# Patient Record
Sex: MALE | Race: WHITE
[De-identification: names, ages, dates, MRNs, and addresses within clinical notes are randomized per-mention and may not be internally consistent; named-entity substitution may affect disease eponyms.]

---

## 2018-07-23 ENCOUNTER — HOSPITAL ENCOUNTER (EMERGENCY)
Dept: HOSPITAL 36 - ER | Age: 43
Discharge: HOME | End: 2018-07-23
Payer: MEDICAID

## 2018-07-23 DIAGNOSIS — Z90.49: ICD-10-CM

## 2018-07-23 DIAGNOSIS — M25.561: Primary | ICD-10-CM

## 2018-07-23 DIAGNOSIS — M25.461: ICD-10-CM

## 2018-07-23 LAB
ALBUMIN SERPL-MCNC: 4.5 GM/DL (ref 4.2–5.5)
ALBUMIN/GLOB SERPL: 1.6 {RATIO} (ref 1–1.8)
ALP SERPL-CCNC: 70 U/L (ref 34–104)
ALT SERPL-CCNC: 23 U/L (ref 7–52)
ANION GAP SERPL CALC-SCNC: 11.8 MMOL/L (ref 7–16)
APPEARANCE UR: CLEAR
AST SERPL-CCNC: 20 U/L (ref 13–39)
BACTERIA #/AREA URNS HPF: (no result) /HPF
BASOPHILS # BLD AUTO: 0 TH/CUMM (ref 0–0.2)
BASOPHILS NFR BLD AUTO: 0.1 % (ref 0–2)
BILIRUB SERPL-MCNC: 1 MG/DL (ref 0.3–1)
BILIRUB UR-MCNC: NEGATIVE MG/DL
BUN SERPL-MCNC: 11 MG/DL (ref 7–25)
CALCIUM SERPL-MCNC: 9.4 MG/DL (ref 8.6–10.3)
CHLORIDE SERPL-SCNC: 102 MEQ/L (ref 98–107)
CO2 SERPL-SCNC: 26.6 MEQ/L (ref 21–31)
COLOR UR: YELLOW
CREAT SERPL-MCNC: 0.8 MG/DL (ref 0.7–1.3)
EOSINOPHIL # BLD AUTO: 0.1 TH/CMM (ref 0.1–0.4)
EOSINOPHIL NFR BLD AUTO: 0.5 % (ref 0–5)
EPI CELLS URNS QL MICRO: (no result) /LPF
ERYTHROCYTE [DISTWIDTH] IN BLOOD BY AUTOMATED COUNT: 12.6 % (ref 11.5–20)
GLOBULIN SER-MCNC: 2.8 GM/DL
GLUCOSE SERPL-MCNC: 93 MG/DL (ref 70–105)
GLUCOSE UR STRIP-MCNC: NEGATIVE MG/DL
HCT VFR BLD CALC: 48.8 % (ref 41–60)
HGB BLD-MCNC: 16.7 GM/DL (ref 12–16)
INR PPP: 1.04 (ref 0.5–1.4)
KETONES UR STRIP-MCNC: NEGATIVE MG/DL
LEUKOCYTE ESTERASE UR-ACNC: NEGATIVE
LYMPHOCYTE AB SER FC-ACNC: 2.2 TH/CMM (ref 1.5–3)
LYMPHOCYTES NFR BLD AUTO: 19.9 % (ref 20–50)
MCH RBC QN AUTO: 32.1 PG (ref 26–30)
MCHC RBC AUTO-ENTMCNC: 34.3 PG (ref 28–36)
MCV RBC AUTO: 93.6 FL (ref 80–99)
MICRO URNS: YES
MONOCYTES # BLD AUTO: 0.6 TH/CMM (ref 0.3–1)
MONOCYTES NFR BLD AUTO: 5.5 % (ref 2–10)
NEUTROPHILS # BLD: 8 TH/CMM (ref 1.8–8)
NEUTROPHILS NFR BLD AUTO: 74 % (ref 40–80)
NITRITE UR QL STRIP: NEGATIVE
PH UR STRIP: 6 [PH] (ref 4.6–8)
PLATELET # BLD: 272 TH/CMM (ref 150–400)
PMV BLD AUTO: 7.9 FL
POTASSIUM SERPL-SCNC: 3.4 MEQ/L (ref 3.5–5.1)
PROT UR STRIP-MCNC: NEGATIVE MG/DL
PROTHROMBIN TIME: 10.8 SECONDS (ref 9.5–11.5)
RBC # BLD AUTO: 5.21 MIL/CMM (ref 4.3–5.7)
RBC # UR STRIP: NEGATIVE /UL
RBC #/AREA URNS HPF: (no result) /HPF (ref 0–5)
SODIUM SERPL-SCNC: 137 MEQ/L (ref 136–145)
SP GR UR STRIP: 1.02 (ref 1–1.03)
URINALYSIS COMPLETE PNL UR: (no result)
UROBILINOGEN UR STRIP-ACNC: 0.2 E.U./DL (ref 0.2–1)
WBC # BLD AUTO: 10.9 TH/CMM (ref 4.8–10.8)
WBC #/AREA URNS HPF: (no result) /HPF (ref 0–5)

## 2018-07-23 PROCEDURE — 85610 PROTHROMBIN TIME: CPT

## 2018-07-23 PROCEDURE — 85730 THROMBOPLASTIN TIME PARTIAL: CPT

## 2018-07-23 PROCEDURE — 87040 BLOOD CULTURE FOR BACTERIA: CPT

## 2018-07-23 PROCEDURE — 84550 ASSAY OF BLOOD/URIC ACID: CPT

## 2018-07-23 PROCEDURE — 73702 CT LWR EXTREMITY W/O&W/DYE: CPT

## 2018-07-23 PROCEDURE — 85025 COMPLETE CBC W/AUTO DIFF WBC: CPT

## 2018-07-23 PROCEDURE — 81001 URINALYSIS AUTO W/SCOPE: CPT

## 2018-07-23 PROCEDURE — 80053 COMPREHEN METABOLIC PANEL: CPT

## 2018-07-23 PROCEDURE — 99285 EMERGENCY DEPT VISIT HI MDM: CPT

## 2018-07-23 PROCEDURE — 36415 COLL VENOUS BLD VENIPUNCTURE: CPT

## 2018-07-23 PROCEDURE — 96372 THER/PROPH/DIAG INJ SC/IM: CPT

## 2018-07-23 NOTE — ED PHYSICIAN CHART
ED Chief Complaint/HPI





- Patient Information


Date Seen:: 07/23/18


Time Seen:: 13:50


Chief Complaint:: RIGHT KNEE PAIN


History of Present Illness:: 





THIS IS A 42 YEAR OLD MALE WHO IS CONCERNED ABOUT THE PAIN AND SWELLING OF THE 

RIGHT KNEE WHICH GOT SO BAD THIS AM HE COULD NOT WALK TODAY.   HE DENIES HAVING 

ANY RECENT OR OLD RIGHT KNEE TRAUMA. HE DENIES DIABETES, HTN OR HEART DISEASE.


Allergies:: 


 Allergies











Allergy/AdvReac Type Severity Reaction Status Date / Time


 


No Known Allergies Allergy   Verified 07/23/18 13:52











Vitals:: 


 Vital Signs - 8 hr











  07/23/18





  13:52


 


Temp 98.7 F


 


HR 83


 


RR 16


 


/76


 


O2 Sat % 97











Historian:: Patient


Review:: Nurse's Note Reviewed





ED Review of Systems





- Review of Systems


Musculoskeletal: Bone or joint pain (RIGHT KNEE PAIN)





ED Past Medical History





- Past Medical History


Obtainable: Yes


Past Medical History: No significant medical hx


Family History: None


Social History: Non Smoker, No Alcohol, No Drug Use, , Employed


Surgical History: Appendectomy


Psychiatricy History: None


Medication: Reviewed





Family Medical History





- Family Member


  ** Father


History Unknown: Yes





ED Physical Exam





- Physical Examination


General/Constitutional: Awake, Well-developed, well-nourished, Alert, No 

distress, GCS 15, Non-toxic appearing, Ambulatory


Head: Atraumatic


Eyes: Lids, conjuctiva normal, PERRL, EOMI


Skin: Nl inspection, No rash, No skin lesions, No ecchymosis, Well hydrated, No 

lymphadenopathy


ENMT: External ears, nose nl, Nasal exam nl, Lips, teeth, gums nl


Neck: Nontender, Full ROM w/o pain, No JVD, No nuchal rigidity, No bruit, No 

mass, No stridor


Respiratory: Nl effort/Exclusion, Clear to Auscultation, No Wheeze/Rhonchi/Rales


Cardio Vascular: RRR, No murmur, gallop, rubs, NL S1 S2


GI: No tenderness/rebounding/guarding, No organomegaly, No hernia, Normal BS's, 

Nondistended, No mass/bruits, No McBurney tenderness


: No CVA tenderness


Extremities: No tenderness or effusion (RIGHT KNEE IS SWOLLEN WITH AN EFFUSION, 

WARM, TENDER AND RED.), Full ROM, normal strength in all extremities, No edema, 

Normal digits & nails


Neuro/Psych: Alert/oriented, DTR's symmetric, Normal sensory exam, Normal motor 

strength, Judgement/insight normal, Mood normal, Normal gait, No focal deficits


Misc: Normal back, No paraspinal tenderness





ED Septic Shock





- <6hrs of presentation:


Vital Signs: 


 Vital Signs - 8 hr











  07/23/18





  13:52


 


Temp 98.7 F


 


HR 83


 


RR 16


 


/76


 


O2 Sat % 97

## 2018-07-24 ENCOUNTER — HOSPITAL ENCOUNTER (INPATIENT)
Dept: HOSPITAL 36 - ER | Age: 43
LOS: 2 days | Discharge: HOME | DRG: 720 | End: 2018-07-26
Attending: FAMILY MEDICINE | Admitting: FAMILY MEDICINE
Payer: MEDICAID

## 2018-07-24 DIAGNOSIS — M00.9: ICD-10-CM

## 2018-07-24 DIAGNOSIS — L02.415: ICD-10-CM

## 2018-07-24 DIAGNOSIS — M70.40: ICD-10-CM

## 2018-07-24 DIAGNOSIS — L03.115: ICD-10-CM
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
## 2018-07-24 DIAGNOSIS — F17.200: ICD-10-CM

## 2018-07-24 DIAGNOSIS — A41.9: Primary | ICD-10-CM

## 2018-07-24 LAB
ALBUMIN SERPL-MCNC: 3.8 GM/DL (ref 4.2–5.5)
ALBUMIN/GLOB SERPL: 1.5 {RATIO} (ref 1–1.8)
ALP SERPL-CCNC: 72 U/L (ref 34–104)
ALT SERPL-CCNC: 21 U/L (ref 7–52)
ANION GAP SERPL CALC-SCNC: 9 MMOL/L (ref 7–16)
AST SERPL-CCNC: 24 U/L (ref 13–39)
BASOPHILS # BLD AUTO: 0 TH/CUMM (ref 0–0.2)
BASOPHILS NFR BLD AUTO: 0.2 % (ref 0–2)
BILIRUB SERPL-MCNC: 1 MG/DL (ref 0.3–1)
BUN SERPL-MCNC: 10 MG/DL (ref 7–25)
CALCIUM SERPL-MCNC: 8.9 MG/DL (ref 8.6–10.3)
CHLORIDE SERPL-SCNC: 107 MEQ/L (ref 98–107)
CO2 SERPL-SCNC: 24.9 MEQ/L (ref 21–31)
CREAT SERPL-MCNC: 0.7 MG/DL (ref 0.7–1.3)
EOSINOPHIL # BLD AUTO: 0.1 TH/CMM (ref 0.1–0.4)
EOSINOPHIL NFR BLD AUTO: 0.7 % (ref 0–5)
ERYTHROCYTE [DISTWIDTH] IN BLOOD BY AUTOMATED COUNT: 12.3 % (ref 11.5–20)
ERYTHROCYTE [SEDIMENTATION RATE] IN BLOOD: 8 MM/HR (ref 0–20)
GLOBULIN SER-MCNC: 2.5 GM/DL
GLUCOSE SERPL-MCNC: 114 MG/DL (ref 70–105)
HCT VFR BLD CALC: 45.1 % (ref 41–60)
HGB BLD-MCNC: 15.5 GM/DL (ref 12–16)
LYMPHOCYTE AB SER FC-ACNC: 1.8 TH/CMM (ref 1.5–3)
LYMPHOCYTES NFR BLD AUTO: 19.2 % (ref 20–50)
MCH RBC QN AUTO: 32 PG (ref 26–30)
MCHC RBC AUTO-ENTMCNC: 34.3 PG (ref 28–36)
MCV RBC AUTO: 93.5 FL (ref 80–99)
MONOCYTES # BLD AUTO: 0.8 TH/CMM (ref 0.3–1)
MONOCYTES NFR BLD AUTO: 8.2 % (ref 2–10)
NEUTROPHILS # BLD: 6.9 TH/CMM (ref 1.8–8)
NEUTROPHILS NFR BLD AUTO: 71.7 % (ref 40–80)
PLATELET # BLD: 250 TH/CMM (ref 150–400)
PMV BLD AUTO: 8.1 FL
POTASSIUM SERPL-SCNC: 3.9 MEQ/L (ref 3.5–5.1)
RBC # BLD AUTO: 4.83 MIL/CMM (ref 4.3–5.7)
SODIUM SERPL-SCNC: 137 MEQ/L (ref 136–145)
WBC # BLD AUTO: 9.6 TH/CMM (ref 4.8–10.8)

## 2018-07-24 RX ADMIN — DEXTROSE AND SODIUM CHLORIDE SCH MLS/HR: 5; .45 INJECTION, SOLUTION INTRAVENOUS at 17:51

## 2018-07-24 NOTE — DIAGNOSTIC IMAGING REPORT
CT right knee with IV contrast



History: Pain, swelling, effusion



Comparison: None



Technique: Axial images were obtained from the distal femur or proximal

tibia with IV contrast.  Reconstructions were made.



Total , CTD I 4



Findings:



Exam is limited due to motion including limiting assessment for

fractures.  Minimal degenerative changes are noted.  The knee joint

spaces are preserved.  No evidence of a joint effusion.  There is

diffuse subcutaneous edema and seen throughout the prepatellar and

infrapatellar soft tissues with small amount of fluid and possible

phlegmonous changes.  There may be tiny filling defects within the right

popliteal vein.  No evidence of osseous erosions.



IMPRESSION:



Limited exam due to motion.  There is diffuse subcutaneous edema and

inflammatory changes along the prepatellar and infrapatellar soft

tissues.  Small amount of fluid is seen located along the prepatellar

region.  Phlegmonous changes and small early abscess formation this

region cannot be excluded.  Clinical correlation and follow-up is

recommended.



Possible small Filling defect within the right popliteal vein.  This may

be due to the phase of contrast opacification, however, DVT in these

regions cannot be excluded.  Recommend dedicated DVT study for further

assessment



No evidence of a knee effusion.



The final results and recommendations were administered and discussed

with the referring team on 7/24/2018 8:44 AM.

## 2018-07-24 NOTE — DIAGNOSTIC IMAGING REPORT
Right knee 3 views



Indication: pain



Comparison: Right knee CT on 7/23/2018 



Findings: No evidence of an acute fracture or dislocation.  No osseous

erosions.  No joint effusion.  There is subcutaneous edema with soft

tissue swelling greatest along the prepatellar and infrapatellar

regions.



Impression:



No evidence of an acute fracture.



Subcutaneous edema and soft tissue swelling greatest along the

prepatellar and infrapatellar regions.  Please refer to recent CT

examination for further details.



In the setting of trauma, if clinical symptoms persist and there is

continued concern for an occult fracture, follow up exams in 5-7 days is

suggested.

## 2018-07-24 NOTE — ED PHYSICIAN CHART
ED Chief Complaint/HPI





- Patient Information


Date Seen:: 07/24/18


Time Seen:: 05:10


Chief Complaint:: right knee pain


History of Present Illness:: 





Patient had onset 2 days ago of right knee pain.  Patient's had chills but no 

fever.  No trauma.  Patient was seen in this emergency department yesterday and 

prescribed Cipro and a Z-Amador.  He is taken two of the Z-Amador and 1 Cipro.  Right 

knee pain is increased.


Allergies:: 


 Allergies











Allergy/AdvReac Type Severity Reaction Status Date / Time


 


No Known Allergies Allergy   Verified 07/24/18 04:55











Vitals:: 


 Vital Signs - 8 hr











  07/24/18





  04:50


 


Temp 98.4 F


 


HR 83


 


RR 18


 


/83


 


O2 Sat % 97











Historian:: Patient, Family Member


Review:: Nurse's Note Reviewed





ED Review of Systems





- Review of Systems


General/Constitutional: Chills


Skin: Skin lesions


Head: No headache


Eyes: No loss of vision


ENT: No earache


Neck: No neck pain


Cardio Vascular: No chest pain


Pulmonary: No SOB


GI: No nausea, No vomiting, No diarrhea


G/U: No dysuria


Musculoskeletal: Bone or joint pain


Endocrine: No polyuria


Psychiatric: No prior psych history, No anxiety


Hematopoietic: No bruising


Allergic/Immuno: No urticaria


Neurological: Focal symptoms, Weakness





ED Past Medical History





- Past Medical History


Past Medical History: No significant medical hx


Family History: None


Social History: Smoker, Alcohol, Other (slight tobacco and slight alcohol use)


Surgical History: Appendectomy


Psychiatricy History: None


Medication: Reviewed





Family Medical History





- Family Member


  ** Father


History Unknown: Yes


Ethnicity: 





ED Physical Exam





- Physical Examination


General/Constitutional: Awake, Well-developed, well-nourished, Alert, No 

distress, GCS 15, Non-toxic appearing, Ambulatory


Head: Atraumatic


Eyes: Lids, conjuctiva normal, PERRL, EOMI


Skin: Well hydrated, No lymphadenopathy


Other Skin comments:: 





Right leg:  there is about 25 cm of pale erythema extending distally from the 

skin over the most proximal aspect of the patella.  The redness is over the 

anterior and lateral aspect of the knee and right lower leg.   Over the 

patellar tendon there is  about a 2-1/2 cm slightly raised area which is the 

site of maximum tenderness


ENMT: External ears, nose nl, Nasal exam nl, Lips, teeth, gums nl


Neck: Nontender, Full ROM w/o pain, No JVD, No nuchal rigidity, No bruit, No 

mass, No stridor


Respiratory: Nl effort/Exclusion, Clear to Auscultation, No Wheeze/Rhonchi/Rales


Cardio Vascular: RRR, No murmur, gallop, rubs, NL S1 S2


GI: No tenderness/rebounding/guarding, No organomegaly, No hernia, Normal BS's, 

Nondistended, No mass/bruits, No McBurney tenderness


: No CVA tenderness


Extremities: No edema


Other Extremities comments:: 





About 90 of passive flexion right knee


Neuro/Psych: Alert/oriented, DTR's symmetric, Normal sensory exam, Normal motor 

strength, Judgement/insight normal, Mood normal, Normal gait, No focal deficits


Misc: Normal back, No paraspinal tenderness





ED Assessment





- Assessment


General Assessment: 





The area of maximum swelling and tenderness over the patellar tendon may be an 

early abscess.  If adeline MRSA is a major consideration of the etiology of the 

infection.  Since the infection seems to be getting worse is appropriate to 

admit the patient.  I spoke to Dr. Aidan Wynne at about 0600 and he will be 

the admitting physician/





ED Septic Shock





- .


Is Septic Shock (SBP<90, OR Lactate>4 mmol\L) present?: No





- <6hrs of presentation:


Vital Signs: 


 Vital Signs - 8 hr











  07/24/18





  04:50


 


Temp 98.4 F


 


HR 83


 


RR 18


 


/83


 


O2 Sat % 97














ED Reassessment (Disposition)





- Reassessment


Reassessment Condition:: Unchanged





- Diagnosis


Diagnosis:: 


Cellulitis right knee and right lower leg; early abscess right knee





- Patient Disposition


Admitted to:: Med/Surg


Spoke to:: Aidan Wynne


Admitting Medical Physician:: Aidan Wynne


Condition at Disposition:: Stable, Unchanged

## 2018-07-24 NOTE — HISTORY AND PHYSICAL
History of Present Illness





- HPI


Chief Complaint: 





right knee pain


HPI: 





43 y/o male who presents to [facility] ER for right knee pain 

and swelling. Patient was seen in the ER prior to admission and was initially 

treat with PO antibiotics. Patient return to the ER with worsening of his 

symptoms. Initial labwork showed a slight increased in WBC's to 10.9. Patient K

+ was noted to be low at 3.4. Patient is a  and his swelling 

has been increasing for the past 2 days. Patient's had chills but no fever.  No 

trauma.  Patient was seen in this emergency department yesterday and prescribed 

Cipro and a Z-Amador.  He is taken two of the Z-Amador and 1 Cipro.  Right knee pain 

is increased.


Vital Signs: 





 Last Vital Signs











Temp  98.0 F   07/24/18 06:55


 


Pulse  78   07/24/18 06:55


 


Resp  18   07/24/18 06:55


 


BP  125/80   07/24/18 06:55


 


Pulse Ox  98   07/24/18 06:55














Past Medical History


Cardiovascular: Report: No Pertinent Hx


Pulmonary: Report: No Pertinent Hx


CNS: Report: No Pertinent Hx


GI: Report: No Pertinent Hx


Psych: Report: No Pertinent Hx


Musculoskeletal: Report: Swelling (right knee swelling and pain)


Rheumatologic: Report: No pertinent Hx


Infectious Disease: Report: No Pertinent Hx


Renal/: Report: No Pertinent Hx


Endocrine: Report: No Pertinent Hx


Dermatology: Report: No Pertinent Hx





- Past Surgical History


Past Surgical History: No pertinent Hx





Family Medical History





- Family Member


  ** Father


History Unknown: Yes


Ethnicity: 





Social History


Smoke: No


Alcohol: None


Drugs: None


Lives: With Family





- Medications


Home Medications: 


Home Medication











 Medication  Instructions  Recorded  Type


 


Azithromycin [Zithromax Tri-Amador] 500 mg PO DAILY #1 tab 07/23/18 Rx


 


Ciprofloxacin HCl [Cipro] 250 mg PO BID 7 Days #14 tab 07/23/18 Rx














- Allergies


Allergies/Adverse Reactions: 


 Allergies











Allergy/AdvReac Type Severity Reaction Status Date / Time


 


No Known Allergies Allergy   Verified 07/24/18 04:55














Review of Systems





- Review of Systems


Constitutional: Report: No Significant


Eyes: Report: No Significant


ENT: Report: No Significant


Respiratory: Report: No Significant


Cardiovascular: Report: No Significant


Gastrointestinal: Report: No Significant


Genitourinary: Report: No Significant


Musculoskeletal: Report: Other (right knee pain)


Skin: Report: No Significant


Neurological: Report: No Significant





Physical Exam





- Physical Exam


HEENT: Report: Ears Nose Throat within normal limits, Pharnyx within normal 

limits


Neck: Report: Within normal limits


Cardiovascular Systems: Report: +s1/s2 noted, Regular, Rate and Rhythm


Respiratory: Report: Breath Sounds are within normal limits, Clear to 

Auscultation of lung fields


Abdomen: Report: Non-tender to palpation


Back: Report: Inspection of back is within normal limits.


Extremities: Report: Other (right knee erythema, swelling noted to the 

prepateller region)


Skin: Report: Warm (warmth and redness noted at the right prepatellar area)


Neuro/Psych: Report: Mood affect is within normal limits, A+Ox3, CN II-XII 

intact





- Assessment


Assessment: 





right knee cellulitis


possible early right knee abscess


sepsis





- Plan


Plan: 





will order ortho consult


xray of the right knee


vancomycin per pharmacy


zosyn IV


repeat CBC, CMP, ESR, blood cultures


IV pain meds

## 2018-07-25 NOTE — DIAGNOSTIC IMAGING REPORT
Right lower extremity Doppler venous ultrasound exam



HISTORY: Pain/swelling



Sonographic sector images were obtained through the deep venous systems

of the right leg.  Associated Doppler data was obtained.



The exam demonstrates patency of the common femoral, superficial

femoral, popliteal, and posterior tibial veins.  Specifically, no

thrombus is seen. There are normal compressibility and augmentation

responses.



IMPRESSION: Negative exam for deep vein thrombophlebitis.

## 2018-07-25 NOTE — GENERAL PROGRESS NOTE
Subjective





- Review of Systems


Service Date: 07/25/18


Subjective: 





Patient was seen and examined. Feels better this AM. localized swelling and 

redness to the affected area. Afebrile. 





Objective





- Results


Result Diagrams: 


 07/24/18 08:20





 07/24/18 08:20


Recent Labs: 


 Laboratory Last Values











WBC  9.6 Th/cmm (4.8-10.8)   07/24/18  08:20    


 


RBC  4.83 Mil/cmm (4.30-5.70)   07/24/18  08:20    


 


Hgb  15.5 gm/dL (12-16)   07/24/18  08:20    


 


Hct  45.1 % (41.0-60)   07/24/18  08:20    


 


MCV  93.5 fl (80-99)   07/24/18  08:20    


 


MCH  32.0 pg (26.0-30.0)  H  07/24/18  08:20    


 


MCHC Differential  34.3 pg (28.0-36.0)   07/24/18  08:20    


 


RDW  12.3 % (11.5-20.0)   07/24/18  08:20    


 


Plt Count  250 Th/cmm (150-400)   07/24/18  08:20    


 


MPV  8.1 fl  07/24/18  08:20    


 


Neutrophils %  71.7 % (40.0-80.0)   07/24/18  08:20    


 


Lymphocytes %  19.2 % (20.0-50.0)  L  07/24/18  08:20    


 


Monocytes %  8.2 % (2.0-10.0)   07/24/18  08:20    


 


Eosinophils %  0.7 % (0.0-5.0)   07/24/18  08:20    


 


Basophils %  0.2 % (0.0-2.0)   07/24/18  08:20    


 


ESR  8 mm/hr (0-20)   07/24/18  08:20    


 


Sodium  137 mEq/L (136-145)   07/24/18  08:20    


 


Potassium  3.9 mEq/L (3.5-5.1)   07/24/18  08:20    


 


Chloride  107 mEq/L ()   07/24/18  08:20    


 


Carbon Dioxide  24.9 mEq/L (21.0-31.0)   07/24/18  08:20    


 


Anion Gap  9.0  (7.0-16.0)   07/24/18  08:20    


 


BUN  10 mg/dL (7-25)   07/24/18  08:20    


 


Creatinine  0.7 mg/dL (0.7-1.3)   07/24/18  08:20    


 


Est GFR ( Amer)  > 60.0 ml/min (>90)   07/24/18  08:20    


 


Est GFR (Non-Af Amer)  > 60.0 ml/min  07/24/18  08:20    


 


BUN/Creatinine Ratio  14.3   07/24/18  08:20    


 


Glucose  114 mg/dL ()  H  07/24/18  08:20    


 


Calcium  8.9 mg/dL (8.6-10.3)   07/24/18  08:20    


 


Total Bilirubin  1.0 mg/dL (0.3-1.0)   07/24/18  08:20    


 


AST  24 U/L (13-39)   07/24/18  08:20    


 


ALT  21 U/L (7-52)   07/24/18  08:20    


 


Alkaline Phosphatase  72 U/L ()   07/24/18  08:20    


 


Total Protein  6.3 gm/dL (6.0-8.3)   07/24/18  08:20    


 


Albumin  3.8 gm/dL (4.2-5.5)  L  07/24/18  08:20    


 


Globulin  2.5 gm/dL  07/24/18  08:20    


 


Albumin/Globulin Ratio  1.5  (1.0-1.8)   07/24/18  08:20    














- Physical Exam


Vitals and I&O: 


 Vital Signs











Temp  98.2 F   07/25/18 04:00


 


Pulse  67   07/25/18 04:00


 


Resp  17   07/25/18 04:00


 


BP  110/64   07/25/18 04:00


 


Pulse Ox  99   07/25/18 04:00








 Intake & Output











 07/24/18 07/25/18 07/25/18





 18:59 06:59 18:59


 


Intake Total 350 50 


 


Balance 350 50 


 


Weight (lbs)  76.204 kg 


 


Intake:   


 


  Intake, IV Amount 350 50 


 


    Piperacillin Sodium/ 100 50 





    Tazobact 3.375 gm In   





    Sodium Chloride 0.9% 50   





    ml @ 100 mls/hr IV Q6HR   





    [facility] Rx#:[id number]   


 


Other:   


 


  Weight Source  Bedscale 











Active Medications: 


Current Medications





Piperacillin Sod/Tazobactam (Sod 3.375 gm/ Sodium Chloride)  50 mls @ 100 mls/

hr IV Q6HR [facility]


   Stop: 09/22/18 11:59


   Last Admin: 07/25/18 06:06 Dose:  100 mls/hr


Dextrose/Sodium Chloride (D5-0.45ns)  1,000 mls @ 50 mls/hr IV .Q20H [facility]


   Stop: 09/22/18 17:29


   Last Admin: 07/24/18 17:51 Dose:  50 mls/hr


Ibuprofen (Motrin)  600 mg PO TID [facility]


   Stop: 09/22/18 08:59


   Last Admin: 07/24/18 21:47 Dose:  Not Given


Morphine Sulfate (Morphine)  1 mg IVP Q4HR PRN


   PRN Reason: Pain (Moderate)


   Stop: 09/22/18 07:47








General: Alert, Oriented x3, No acute distress


HEENT: Atraumatic, PERRLA, EOMI


Neck: Supple, no JVD


Cardiovascular: Regular rate, Normal S1, Normal S2


Lungs: Clear to auscultation


Abdomen: Bowel sounds


Extremities: no Clubbing, no Cyanosis, no Edema





- Procedures


Procedures: 


 Procedures











Procedure Code Date


 


LAPAROSCOP APPENDECTOMY 47.01 04/15/13


 


LAPAROSCOPY APPENDECTOMY 86358 04/15/13














Assessment/Plan





- Assessment


Assessment: 





right knee cellulitis


possible early right knee abscess


sepsis





- Plan


Plan: 





will order ortho consult


xray of the right knee


vancomycin per pharmacy


zosyn IV


right lower extremity Venous US


IV pain meds

## 2018-07-25 NOTE — CONSULTATION
DATE OF CONSULTATION:     07/25/2018



ORTHOPEDIC SURGERY CONSULTATION



REASON FOR CONSULTATION:  The patient is a 42-year-old male admitted to [facility] on 07/24/2018 because of an infection of his right knee.  I

was called in orthopedic consultation regarding his knee.



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

recall any type of event, injury, laceration, etc. of his right knee.  He began

with pain and swelling and infection several days ago and was seen in the

Emergency Room and prescribed antibiotics.  His symptoms worsened and he

returned and was admitted.



He has no other areas of infection in his body and denies prior similar

situations.



ADDITIONAL PAST HISTORY:  Surgeries include prior appendectomy.  He has no

medical issues.  Takes no prescription medication prior to this event.



PHYSICAL EXAMINATION:  The patient was examined in his hospital room at [facility].  Slight swelling of the prepatellar tendon area was noted

with minimal tenderness.  There was minimal fluctuance of slight fluid under the

skin.  He stated there was markedly more swelling and pain in the prior 2 days

than now -- the antibiotics are helping.



The knee itself is dry with no effusion.  There is nearly normal motion of the

knee with minimal discomfort.  Neurovascular function normal.



IMAGING STUDIES:  I viewed the images in the PACS.  X-rays of the right knee,

3-views 07/24/2018:  The lateral view showed some soft tissue swelling exactly

over the patellar tendon.  Remainder of the views are normal.  There is a CT of

the knee on 07/23/2018, which does not show any evidence of injury or trauma or

fluid in the knee.



He had an ultrasound of the leg for DVT, but none were found  -- it was

negative.



ORTHOPEDIC IMPRESSION:  Prepatellar bursitis, probably septic right knee.



RECOMMENDATIONS:  He is improving nicely on his present antibiotic treatment

regimen.  One might add intermittent warm moist compresses to help resolve this

situation sooner.  As along as his issue resolved, he will not need surgical

exploration or drainage.



I will see him again at your request.





DD: 07/25/2018 16:26

DT: 07/25/2018 16:45

JOB# [id number]  [id number]

CHRISTOPHER

## 2018-07-26 LAB
ANION GAP SERPL CALC-SCNC: 13.6 MMOL/L (ref 7–16)
BASOPHILS # BLD AUTO: 0 TH/CUMM (ref 0–0.2)
BASOPHILS NFR BLD AUTO: 0.7 % (ref 0–2)
BUN SERPL-MCNC: 7 MG/DL (ref 7–25)
CALCIUM SERPL-MCNC: 9.1 MG/DL (ref 8.6–10.3)
CHLORIDE SERPL-SCNC: 105 MEQ/L (ref 98–107)
CO2 SERPL-SCNC: 24.3 MEQ/L (ref 21–31)
CREAT SERPL-MCNC: 0.9 MG/DL (ref 0.7–1.3)
EOSINOPHIL # BLD AUTO: 0.1 TH/CMM (ref 0.1–0.4)
EOSINOPHIL NFR BLD AUTO: 1.9 % (ref 0–5)
ERYTHROCYTE [DISTWIDTH] IN BLOOD BY AUTOMATED COUNT: 12.4 % (ref 11.5–20)
GLUCOSE SERPL-MCNC: 109 MG/DL (ref 70–105)
HCT VFR BLD CALC: 44.1 % (ref 41–60)
HGB BLD-MCNC: 15.2 GM/DL (ref 12–16)
LYMPHOCYTE AB SER FC-ACNC: 1.8 TH/CMM (ref 1.5–3)
LYMPHOCYTES NFR BLD AUTO: 28.5 % (ref 20–50)
MCH RBC QN AUTO: 32.4 PG (ref 26–30)
MCHC RBC AUTO-ENTMCNC: 34.5 PG (ref 28–36)
MCV RBC AUTO: 94 FL (ref 80–99)
MONOCYTES # BLD AUTO: 0.6 TH/CMM (ref 0.3–1)
MONOCYTES NFR BLD AUTO: 9.4 % (ref 2–10)
NEUTROPHILS # BLD: 3.8 TH/CMM (ref 1.8–8)
NEUTROPHILS NFR BLD AUTO: 59.5 % (ref 40–80)
PLATELET # BLD: 275 TH/CMM (ref 150–400)
PMV BLD AUTO: 8.2 FL
POTASSIUM SERPL-SCNC: 3.9 MEQ/L (ref 3.5–5.1)
RBC # BLD AUTO: 4.69 MIL/CMM (ref 4.3–5.7)
SODIUM SERPL-SCNC: 139 MEQ/L (ref 136–145)
WBC # BLD AUTO: 6.3 TH/CMM (ref 4.8–10.8)

## 2018-07-26 RX ADMIN — DEXTROSE AND SODIUM CHLORIDE SCH MLS/HR: 5; .45 INJECTION, SOLUTION INTRAVENOUS at 01:01

## 2018-07-26 NOTE — GENERAL PROGRESS NOTE
Subjective





- Review of Systems


Service Date: 07/26/18


Subjective: 





Patient was seen and examined. Feels better this AM. localized swelling and 

redness to the affected area is slowly improving. Afebrile. 





Objective





- Results


Result Diagrams: 


 07/24/18 08:20





 07/24/18 08:20


Recent Labs: 


 Laboratory Last Values











WBC  9.6 Th/cmm (4.8-10.8)   07/24/18  08:20    


 


RBC  4.83 Mil/cmm (4.30-5.70)   07/24/18  08:20    


 


Hgb  15.5 gm/dL (12-16)   07/24/18  08:20    


 


Hct  45.1 % (41.0-60)   07/24/18  08:20    


 


MCV  93.5 fl (80-99)   07/24/18  08:20    


 


MCH  32.0 pg (26.0-30.0)  H  07/24/18  08:20    


 


MCHC Differential  34.3 pg (28.0-36.0)   07/24/18  08:20    


 


RDW  12.3 % (11.5-20.0)   07/24/18  08:20    


 


Plt Count  250 Th/cmm (150-400)   07/24/18  08:20    


 


MPV  8.1 fl  07/24/18  08:20    


 


Neutrophils %  71.7 % (40.0-80.0)   07/24/18  08:20    


 


Lymphocytes %  19.2 % (20.0-50.0)  L  07/24/18  08:20    


 


Monocytes %  8.2 % (2.0-10.0)   07/24/18  08:20    


 


Eosinophils %  0.7 % (0.0-5.0)   07/24/18  08:20    


 


Basophils %  0.2 % (0.0-2.0)   07/24/18  08:20    


 


ESR  8 mm/hr (0-20)   07/24/18  08:20    


 


Sodium  137 mEq/L (136-145)   07/24/18  08:20    


 


Potassium  3.9 mEq/L (3.5-5.1)   07/24/18  08:20    


 


Chloride  107 mEq/L ()   07/24/18  08:20    


 


Carbon Dioxide  24.9 mEq/L (21.0-31.0)   07/24/18  08:20    


 


Anion Gap  9.0  (7.0-16.0)   07/24/18  08:20    


 


BUN  10 mg/dL (7-25)   07/24/18  08:20    


 


Creatinine  0.7 mg/dL (0.7-1.3)   07/24/18  08:20    


 


Est GFR ( Amer)  > 60.0 ml/min (>90)   07/24/18  08:20    


 


Est GFR (Non-Af Amer)  > 60.0 ml/min  07/24/18  08:20    


 


BUN/Creatinine Ratio  14.3   07/24/18  08:20    


 


Glucose  114 mg/dL ()  H  07/24/18  08:20    


 


Calcium  8.9 mg/dL (8.6-10.3)   07/24/18  08:20    


 


Total Bilirubin  1.0 mg/dL (0.3-1.0)   07/24/18  08:20    


 


AST  24 U/L (13-39)   07/24/18  08:20    


 


ALT  21 U/L (7-52)   07/24/18  08:20    


 


Alkaline Phosphatase  72 U/L ()   07/24/18  08:20    


 


Total Protein  6.3 gm/dL (6.0-8.3)   07/24/18  08:20    


 


Albumin  3.8 gm/dL (4.2-5.5)  L  07/24/18  08:20    


 


Globulin  2.5 gm/dL  07/24/18  08:20    


 


Albumin/Globulin Ratio  1.5  (1.0-1.8)   07/24/18  08:20    














- Physical Exam


Vitals and I&O: 


 Vital Signs











Temp  98.0 F   07/26/18 07:33


 


Pulse  71   07/26/18 07:33


 


Resp  18   07/26/18 07:33


 


BP  116/82   07/26/18 07:33


 


Pulse Ox  98   07/26/18 07:33








 Intake & Output











 07/25/18 07/26/18 07/26/18





 18:59 06:59 18:59


 


Intake Total 1600 50 


 


Balance 1600 50 


 


Weight (lbs)  76.204 kg 


 


Intake:   


 


  Intake, IV Amount 1600 50 


 


    D5-0.45NS 1,000 ml @ 50 1000  





    mls/hr IV .Q20H [facility] Rx#:   





    [id number]   


 


    Piperacillin Sodium/ 100 50 





    Tazobact 3.375 gm In   





    Sodium Chloride 0.9% 50   





    ml @ 100 mls/hr IV Q6HR   





    [facility] Rx#:[id number]   


 


    Vancomycin HCl 1.25 gm In 500  





    Sodium Chloride 0.9% 250   





    ml @ 165 mls/hr IV Q8H   





    [facility] Rx#:[id number]   


 


Other:   


 


  Weight Source  Bedscale 











Active Medications: 


Current Medications





Acetaminophen (Tylenol)  650 mg PO Q6H PRN


   PRN Reason: Headache


   Stop: 09/23/18 07:19


Piperacillin Sod/Tazobactam (Sod 3.375 gm/ Sodium Chloride)  50 mls @ 100 mls/

hr IV Q6HR [facility]


   Stop: 09/22/18 11:59


   Last Admin: 07/26/18 05:47 Dose:  100 mls/hr


Dextrose/Sodium Chloride (D5-0.45ns)  1,000 mls @ 50 mls/hr IV .Q20H [facility]


   Stop: 09/22/18 17:29


   Last Admin: 07/26/18 01:01 Dose:  50 mls/hr


Vancomycin HCl 1.25 gm/ Sodium (Chloride)  250 mls @ 165 mls/hr IV Q8H [facility]


   Stop: 09/23/18 07:59


   Last Admin: 07/26/18 01:02 Dose:  165 mls/hr


Ibuprofen (Motrin)  800 mg PO BID PRN


   PRN Reason: Pain (Mild)


   Stop: 09/23/18 07:18


   Last Admin: 07/25/18 07:56 Dose:  800 mg


Miscellaneous (Vancomycin Iv Per Pharmacy)  1 ea MC PRN PRN


   PRN Reason: PROTOCOL


   Stop: 09/23/18 07:16


Morphine Sulfate (Morphine)  1 mg IVP Q4HR PRN


   PRN Reason: Pain (Moderate)


   Stop: 09/22/18 07:47








General: Alert, Oriented x3, No acute distress


HEENT: Atraumatic, PERRLA, EOMI


Neck: Supple, no JVD


Cardiovascular: Regular rate, Normal S1, Normal S2


Lungs: Clear to auscultation


Abdomen: Bowel sounds, Splenomegaly


Extremities: Tender (swelling, erythema, redness right knee)





- Procedures


Procedures: 


 Procedures











Procedure Code Date


 


LAPAROSCOP APPENDECTOMY 47.01 04/15/13


 


LAPAROSCOPY APPENDECTOMY 12943 04/15/13














Assessment/Plan





- Assessment


Assessment: 





right knee cellulitis vs bursitis


possible early right knee abscess


sepsis


right knee pain





- Plan


Plan: 





will order ortho consult


xray of the right knee


vancomycin per pharmacy


zosyn IV


right lower extremity Venous US


IV pain meds

## 2018-07-27 NOTE — DISCHARGE SUMMARY
DATE OF DISCHARGE:  07/26/2018



PRILIMINARY DIAGNOSES:  Right knee septic arthritis versus cellulitis, possible

early right knee abscess and sepsis.



DISCHARGE DIAGNOSES:  Right knee septic arthritis versus cellulitis, possible

early right knee abscess and sepsis.



BRIEF HISTORY OF PRESENT ILLNESS:  This is a 42-year-old male who presenting to

the [facility] ER for right knee pain and swelling, was

initially seen in the ER prior to admission and was treated outpatient with p.o.

antibiotics; however, the patient returned to the ER with worsening of his

symptoms.  Initial lab work showed a slight increase in WBCs to 10.9, potassium

was noted to be low at 3.4.  The patient is a  and has been

working on his knees, developed some swelling and increased tenderness for the

past 2 days to the right knee.  Denies any fever or chills.  Denies any trauma. 

Had a CT of the right knee, which revealed early abscess formation.  The patient

was then subsequently admitted for IV antibiotic treatment.



HOSPITAL COURSE:  The patient improved during his hospital stay, was seen and

evaluated by Ortho.  Please see dictated report.  The patient was started on IV

Zosyn and IV vancomycin.  An x-ray of the right knee was ordered.  Please see

dictated report.  Repeat lab work revealed decrease in his white count from

10.6-9.6-6.3.  The patient was subsequently discharged in stable condition to

follow up with his primary care physician in 3 days and given a course of oral

antibiotics to take for the next 10 days, Augmentin 500 three times a day for 10

days.





DD: 07/27/2018 07:43

DT: 07/27/2018 08:07

JOB# [id number]  [id number]